# Patient Record
Sex: FEMALE | Race: BLACK OR AFRICAN AMERICAN | NOT HISPANIC OR LATINO
[De-identification: names, ages, dates, MRNs, and addresses within clinical notes are randomized per-mention and may not be internally consistent; named-entity substitution may affect disease eponyms.]

---

## 2021-08-13 PROBLEM — Z00.00 ENCOUNTER FOR PREVENTIVE HEALTH EXAMINATION: Status: ACTIVE | Noted: 2021-08-13

## 2021-08-16 ENCOUNTER — NON-APPOINTMENT (OUTPATIENT)
Age: 66
End: 2021-08-16

## 2021-08-19 ENCOUNTER — APPOINTMENT (OUTPATIENT)
Dept: OTOLARYNGOLOGY | Facility: CLINIC | Age: 66
End: 2021-08-19
Payer: MEDICAID

## 2021-08-19 VITALS
DIASTOLIC BLOOD PRESSURE: 80 MMHG | TEMPERATURE: 97.6 F | SYSTOLIC BLOOD PRESSURE: 134 MMHG | WEIGHT: 170 LBS | HEIGHT: 70 IN | BODY MASS INDEX: 24.34 KG/M2 | HEART RATE: 74 BPM

## 2021-08-19 DIAGNOSIS — M19.90 UNSPECIFIED OSTEOARTHRITIS, UNSPECIFIED SITE: ICD-10-CM

## 2021-08-19 DIAGNOSIS — E78.5 HYPERLIPIDEMIA, UNSPECIFIED: ICD-10-CM

## 2021-08-19 DIAGNOSIS — E04.2 NONTOXIC MULTINODULAR GOITER: ICD-10-CM

## 2021-08-19 DIAGNOSIS — I10 ESSENTIAL (PRIMARY) HYPERTENSION: ICD-10-CM

## 2021-08-19 DIAGNOSIS — Z86.79 PERSONAL HISTORY OF OTHER DISEASES OF THE CIRCULATORY SYSTEM: ICD-10-CM

## 2021-08-19 DIAGNOSIS — Z83.3 FAMILY HISTORY OF DIABETES MELLITUS: ICD-10-CM

## 2021-08-19 DIAGNOSIS — Z87.891 PERSONAL HISTORY OF NICOTINE DEPENDENCE: ICD-10-CM

## 2021-08-19 DIAGNOSIS — I42.9 CARDIOMYOPATHY, UNSPECIFIED: ICD-10-CM

## 2021-08-19 DIAGNOSIS — M85.80 OTHER SPECIFIED DISORDERS OF BONE DENSITY AND STRUCTURE, UNSPECIFIED SITE: ICD-10-CM

## 2021-08-19 DIAGNOSIS — Z78.9 OTHER SPECIFIED HEALTH STATUS: ICD-10-CM

## 2021-08-19 DIAGNOSIS — Z80.9 FAMILY HISTORY OF MALIGNANT NEOPLASM, UNSPECIFIED: ICD-10-CM

## 2021-08-19 PROCEDURE — 99204 OFFICE O/P NEW MOD 45 MIN: CPT | Mod: 25

## 2021-08-19 PROCEDURE — 31575 DIAGNOSTIC LARYNGOSCOPY: CPT

## 2021-08-19 RX ORDER — ATORVASTATIN CALCIUM 20 MG/1
20 TABLET, FILM COATED ORAL
Refills: 0 | Status: ACTIVE | COMMUNITY

## 2021-08-31 PROBLEM — M19.90 OSTEOARTHRITIS: Status: RESOLVED | Noted: 2021-08-31 | Resolved: 2021-08-31

## 2021-08-31 PROBLEM — Z87.891 FORMER SMOKER, STOPPED SMOKING IN DISTANT PAST: Status: ACTIVE | Noted: 2021-08-31

## 2021-08-31 PROBLEM — E04.2 MULTIPLE THYROID NODULES: Status: ACTIVE | Noted: 2021-08-31

## 2021-08-31 RX ORDER — SOTALOL HYDROCHLORIDE TABLES AF 120 MG/1
120 TABLET ORAL
Refills: 0 | Status: ACTIVE | COMMUNITY

## 2021-08-31 RX ORDER — LEFLUNOMIDE 10 MG/1
10 TABLET, FILM COATED ORAL DAILY
Refills: 0 | Status: ACTIVE | COMMUNITY

## 2021-08-31 RX ORDER — FUROSEMIDE 40 MG/1
40 TABLET ORAL TWICE DAILY
Refills: 0 | Status: ACTIVE | COMMUNITY

## 2021-08-31 RX ORDER — CALCIUM CITRATE/VITAMIN D3 200MG-6.25
TABLET ORAL DAILY
Refills: 0 | Status: ACTIVE | COMMUNITY
Start: 2021-08-19

## 2021-08-31 RX ORDER — SPIRONOLACTONE 25 MG/1
25 TABLET ORAL DAILY
Refills: 0 | Status: ACTIVE | COMMUNITY

## 2021-08-31 RX ORDER — SACUBITRIL AND VALSARTAN 24; 26 MG/1; MG/1
24-26 TABLET, FILM COATED ORAL TWICE DAILY
Refills: 0 | Status: ACTIVE | COMMUNITY

## 2021-08-31 RX ORDER — CARVEDILOL 12.5 MG/1
12.5 TABLET, FILM COATED ORAL
Refills: 0 | Status: ACTIVE | COMMUNITY

## 2021-08-31 NOTE — ASSESSMENT
[FreeTextEntry1] : Ms. NEFF has primary hyperparathyroidism and hypercalcemia, with calcium in low 11s an intact PTH 180s.\par She has osteopenia, fatigue and increased urinary frequency.\par US and 4D CT localized a likely left upper parathyroid adenoma.\par She also has multiple thyroid nodules/cysts, none of which are suspicious.\par \par I recommended neck exploration and parathyroidectomy.\par We discussed the neck exploration and parathyroidectomy procedure, along with risks benefits and alternatives. Risks include, but are not limited to, bleeding infection, hypocalcemia, persistent hypercalcemia, voice change, vocal fold paresis/paralysis and cervical scar.  Questions were answered. \par Surgery has been scheduled for 9/27/2021 at [facility]. \par Medical evaluation and clearance by Dr. Amador would be greatly appreciated.\par \par \par

## 2021-08-31 NOTE — CONSULT LETTER
[Dear  ___] : Dear  [unfilled], [( Thank you for referring [unfilled] for consultation for _____ )] : Thank you for referring [unfilled] for consultation for [unfilled] [Please see my note below.] : Please see my note below. [Consult Closing:] : Thank you very much for allowing me to participate in the care of this patient.  If you have any questions, please do not hesitate to contact me. [Sincerely,] : Sincerely, [DrJesus  ___] : Dr. ELMORE [FreeTextEntry2] : Gemma Daniels M.D.\[address]\[place] [address]  [FreeTextEntry3] : \par Carley Gonsalez MD \par Otolaryngology, Head and Neck Surgery \par \par

## 2021-08-31 NOTE — REVIEW OF SYSTEMS
[Patient Intake Form Reviewed] : Patient intake form was reviewed [Post Nasal Drip] : post nasal drip [Shortness Of Breath] : shortness of breath [Arthralgias] : arthralgias [Joint Pain] : joint pain [Joint Stiffness] : joint stiffness [Negative] : Heme/Lymph [FreeTextEntry5] : irregular heart beat

## 2021-08-31 NOTE — PHYSICAL EXAM
[Midline] : trachea located in midline position [Normal] : no rashes [FreeTextEntry1] : No hoarseness.  [Laryngoscopy Performed] : laryngoscopy was performed, see procedure section for findings [de-identified] : Carotid pulses 2+ bilateral.

## 2021-08-31 NOTE — PROCEDURE
[de-identified] : \par Indication:  multiple thyroid nodules\par -Verbal consent was obtained from patient prior to procedure.\par Flexible laryngoscopy was performed via mouth and revealed the following:\par   -- Base of tongue was symmetric and not enlarged.\par   -- Vallecula was clear\par   -- Epiglottis, both aryepiglottic folds and both false vocal folds were normal\par   -- Arytenoids both without edema and erythema \par   -- True vocal folds were fully mobile and without lesions. \par   -- Post cricoid area was clear.\par   -- Interarytenoid edema was absent.\par   -- No lesions in laryngopharynx\par \par The patient tolerated the procedure well.\par

## 2021-08-31 NOTE — HISTORY OF PRESENT ILLNESS
[de-identified] : I was pleased to evaluate Ms. NEFF, a 65 year  old woman who was referred by Dr. Daniels for hypercalcemia and primary hyperparathyroidism.  \par \par A few months ago she found out she has high calciums levels - low 11s, with intact PTH 180s\par She has osteopenia.  She has osteoarthritis in her knees.  She is taking Citracal for her RA.\par She has nocturnal urinary frequency.  She has been more tired lately\par No kidney stones, fractures, bone pain other than in knees, abdominal pain or constipation\par No family history of hypercalcemia or hyperparathyroidism\par \par LABS\par (06/01/2021) -- Ca 11.2, alb 4.1, Cr 0.88\par (2/25/2021) --Ca 11.3\par \par \par CT PARATHYROID WITHOUT AND WITH CONTRAST  (07-) at [facility]\par - COMPARISON: Parathyroid ultrasound performed same day. Chest CT dated 3/20/2014.\par ** PARATHYROID: Left superior parathyroid adenoma is identified as an iodine-poor enhancing nodule along the posterior margin of the left mid thyroid, measuring 1.9 x 0.9 x 0.9 cm (816 mg by CT, axial arterial series 16550 image #274). This is bounded medially by the esophagus, dorsally by longus colli muscle and laterally by the left inferior thyroidal artery and left carotid artery. This corresponds to a 2.1 x 0.8 x 1.0 cm hypoechoic vascular nodule on image #90 of the concurrently performed ultrasound study. This has increased in size in retrospect when compared to the prior chest CT from 3/28/2014.\par Normal sized right superior parathyroid gland (32 mg by CT) is identified as a 0.3 x 0.4 x 0.5 cm enhancing nodule along the posterior margin of the right mid thyroid, bounded medially by the esophagus, dorsally by longus colli muscle and laterally by the inferior thyroidal artery (axial arterial series 51280 image #272). This corresponds to a 1.0 x 0.4 x 0.5 cm mixed echogenicity nodule along the posterior margin of the right mid thyroid on image #47 of the concurrent ultrasound.\par ** THYROID: There are multiple nodules, largest measuring up to 1.0 cm in the right thyroid lobe. There is a nodular focus of ectopic thyroid tissue along the inferior margin of the left lower pole of thyroid, in retrospect not substantially changed compared to prior chest CT.\par ** REMAINING NECK/CHEST: Left-sided pacemaker is observed. There is a periapical lucency overlying the left maxillary second molar tooth with dehiscence of the buccal maxillary cortex. There are atherosclerotic calcifications about the aortic arch and bilateral carotid bulbs. There is torus mandibularis. There are degenerative changes of the imaged cervicothoracic spine. There is a small polypoid projection of soft tissue along the inferomedial margin of the right palatine tonsil (coronal series 09381 image #91).\par IMPRESSION:\par 1.) Left superior parathyroid adenoma (816 mg by CT) along the posterior margin of the left mid thyroid, bounded medially by the esophagus, dorsally by longus colli muscle and laterally by the left inferior thyroidal artery and left carotid artery.\par 2.) Normal-sized right superior parathyroid gland (32 mg by CT) along the posterior margin of the right mid thyroid, bounded medially by the esophagus, dorsally by longus colli muscle and laterally by the right inferior thyroidal artery.\par 3.) Multinodular thyroid gland; please see concurrent ultrasound. Nodular focus of ectopic thyroid tissue along the inferior margin of the left lower pole of thyroid.\par 4.) Periapical lucency overlying the left maxillary second molar tooth with dehiscence of the buccal maxillary cortex. Dental consultation suggested.\par 5.) Degenerative changes of the imaged cervicothoracic spine. \par 6.) Small polypoid projection of soft tissue along the inferomedial margin of the right palatine tonsil, for which ENT correlation with direct visualization is recommended.\par ADDENDUM (07-): With further review, there is difficult evaluation of the 1.1-cm nodular focus of soft tissue directly inferior to the left lower pole of thyroid (precontrast series 96549 image #188) due to streak artifact. While this is most likely to represent ectopic thyroid tissue given high noncontrast density, the possibility of a second enlarged parathyroid gland is conceivable although considered much less likely.\par (Images were reviewed.) \par \par  \par ULTRASOUND PARATHYROID (07-) at [facility]\par - RIGHT LOBE:  5.4 x 1.5 x 2 cm. \par     1.) 1.4 x 1.1 x 1.2 cm upper pole multiseptated cyst with an area of solid mural nodularity \par     2.) 0.5 x 0.4 x 0.5 cm hypoechoic nodule EXOPHYTIC to the right lower pole with minimal vascularity\par     3.) 1.3 x 0.5 x 0.9 cm ovoid heterogeneous hypoechoic nodule posterior to the midpole \par - LEFT LOBE:  5.4 x 1.6 x 2 cm \par     1.) 0.6 x 0.3 x 0.6 cm upper pole cyst \par     2.) 0.7 x 0.5 x 0.7 cm  a lower pole solid and cystic nodule \par     3.) 0.4 x 0.2 x 0.3 cm lower pole hypoechoic nodule \par - 2.1 x 0.8 x 1 cm Heterogeneous solid hypoechoic nodule posterior to the upper to mid pole with central linear vascularity and possible peripheral feeding vessel\par - ISTHMUS: Normal size \par IMPRESSION:  \par 1.) Left parathyroid adenoma\par 2.) Probable right upper parathyroid adenoma with nodule inferior to the right lower lobe, possible parathyroid etiology\par 3.) Patient scheduled for follow-up with parathyroid CT scan\par 4.) Bilateral thyroid nodules without suspicious ultrasound features\par \par \par DEXA scan (11-) at [facility]\par  -- Osteopenia \par \par \par \par \par

## 2021-09-24 ENCOUNTER — APPOINTMENT (OUTPATIENT)
Dept: DISASTER EMERGENCY | Facility: CLINIC | Age: 66
End: 2021-09-24

## 2021-09-24 VITALS
DIASTOLIC BLOOD PRESSURE: 69 MMHG | HEART RATE: 61 BPM | HEIGHT: 70 IN | RESPIRATION RATE: 16 BRPM | WEIGHT: 173.94 LBS | SYSTOLIC BLOOD PRESSURE: 118 MMHG | OXYGEN SATURATION: 98 % | TEMPERATURE: 97 F

## 2021-09-24 DIAGNOSIS — Z01.818 ENCOUNTER FOR OTHER PREPROCEDURAL EXAMINATION: ICD-10-CM

## 2021-09-24 RX ORDER — LEFLUNOMIDE 10 MG/1
1 TABLET ORAL
Qty: 0 | Refills: 0 | DISCHARGE

## 2021-09-24 NOTE — ASU PATIENT PROFILE, ADULT - NSICDXPASTMEDICALHX_GEN_ALL_CORE_FT
PAST MEDICAL HISTORY:  Chronic systolic congestive heart failure     Glaucoma     H/O breast biopsy     H/O cardiomyopathy     H/O ovarian cystectomy     H/O ventricular tachycardia     History of appendectomy     HLD (hyperlipidemia)     HTN (hypertension)     Hyperparathyroidism     Implantable cardioverter-defibrillator (ICD) in situ [employer] Judechronic systolic heart f    SRI on CPAP     Rheumatoid arthritis      PAST MEDICAL HISTORY:  Chronic systolic congestive heart failure     Glaucoma     H/O cardiomyopathy     HLD (hyperlipidemia)     HTN (hypertension)     Hyperparathyroidism     Implantable cardioverter-defibrillator (ICD) in situ [employer] Judechronic systolic heart f    SRI on CPAP     Rheumatoid arthritis     Ventricular tachycardia

## 2021-09-24 NOTE — ASU PATIENT PROFILE, ADULT - NSICDXPASTSURGICALHX_GEN_ALL_CORE_FT
PAST SURGICAL HISTORY:  H/O breast biopsy     H/O ovarian cystectomy     History of appendectomy

## 2021-09-25 LAB — SARS-COV-2 N GENE NPH QL NAA+PROBE: NOT DETECTED

## 2021-09-27 ENCOUNTER — APPOINTMENT (OUTPATIENT)
Dept: OTOLARYNGOLOGY | Facility: HOSPITAL | Age: 66
End: 2021-09-27

## 2021-09-27 ENCOUNTER — NON-APPOINTMENT (OUTPATIENT)
Age: 66
End: 2021-09-27

## 2021-09-27 ENCOUNTER — RESULT REVIEW (OUTPATIENT)
Age: 66
End: 2021-09-27

## 2021-09-27 ENCOUNTER — TRANSCRIPTION ENCOUNTER (OUTPATIENT)
Age: 66
End: 2021-09-27

## 2021-09-27 ENCOUNTER — OUTPATIENT (OUTPATIENT)
Dept: OUTPATIENT SERVICES | Facility: HOSPITAL | Age: 66
LOS: 1 days | Discharge: HOME | End: 2021-09-27
Payer: MEDICARE

## 2021-09-27 VITALS
HEART RATE: 58 BPM | SYSTOLIC BLOOD PRESSURE: 110 MMHG | OXYGEN SATURATION: 98 % | RESPIRATION RATE: 18 BRPM | DIASTOLIC BLOOD PRESSURE: 60 MMHG

## 2021-09-27 DIAGNOSIS — Z98.890 OTHER SPECIFIED POSTPROCEDURAL STATES: Chronic | ICD-10-CM

## 2021-09-27 DIAGNOSIS — Z90.49 ACQUIRED ABSENCE OF OTHER SPECIFIED PARTS OF DIGESTIVE TRACT: Chronic | ICD-10-CM

## 2021-09-27 LAB
PTH-INTACT IO % DIF SERPL: 227.1 PG/ML — HIGH (ref 15–65)
PTH-INTACT IO % DIF SERPL: 38.8 PG/ML — SIGNIFICANT CHANGE UP (ref 15–65)
PTH-INTACT IO % DIF SERPL: 52.3 PG/ML — SIGNIFICANT CHANGE UP (ref 15–65)

## 2021-09-27 PROCEDURE — 60500 EXPLORE PARATHYROID GLANDS: CPT | Mod: GC

## 2021-09-27 PROCEDURE — 88331 PATH CONSLTJ SURG 1 BLK 1SPC: CPT | Mod: 26

## 2021-09-27 PROCEDURE — 93282 PRGRMG EVAL IMPLANTABLE DFB: CPT | Mod: 26

## 2021-09-27 PROCEDURE — 88305 TISSUE EXAM BY PATHOLOGIST: CPT | Mod: 26

## 2021-09-27 RX ORDER — ACETAMINOPHEN 500 MG
650 TABLET ORAL ONCE
Refills: 0 | Status: DISCONTINUED | OUTPATIENT
Start: 2021-09-27 | End: 2021-09-27

## 2021-09-27 RX ORDER — FUROSEMIDE 40 MG
1 TABLET ORAL
Qty: 0 | Refills: 0 | DISCHARGE

## 2021-09-27 RX ORDER — OXYCODONE HYDROCHLORIDE 5 MG/1
5 TABLET ORAL ONCE
Refills: 0 | Status: DISCONTINUED | OUTPATIENT
Start: 2021-09-27 | End: 2021-09-27

## 2021-09-27 RX ORDER — HYDROMORPHONE HYDROCHLORIDE 2 MG/ML
0.5 INJECTION INTRAMUSCULAR; INTRAVENOUS; SUBCUTANEOUS ONCE
Refills: 0 | Status: DISCONTINUED | OUTPATIENT
Start: 2021-09-27 | End: 2021-09-27

## 2021-09-27 RX ORDER — CARVEDILOL PHOSPHATE 80 MG/1
1 CAPSULE, EXTENDED RELEASE ORAL
Qty: 0 | Refills: 0 | DISCHARGE

## 2021-09-27 RX ORDER — ATORVASTATIN CALCIUM 80 MG/1
1 TABLET, FILM COATED ORAL
Qty: 0 | Refills: 0 | DISCHARGE

## 2021-09-27 RX ORDER — SPIRONOLACTONE 25 MG/1
1 TABLET, FILM COATED ORAL
Qty: 0 | Refills: 0 | DISCHARGE

## 2021-09-27 RX ORDER — BENZOCAINE AND MENTHOL 5; 1 G/100ML; G/100ML
1 LIQUID ORAL ONCE
Refills: 0 | Status: DISCONTINUED | OUTPATIENT
Start: 2021-09-27 | End: 2021-09-27

## 2021-09-27 RX ORDER — SOTALOL HCL 120 MG
1 TABLET ORAL
Qty: 0 | Refills: 0 | DISCHARGE

## 2021-09-27 RX ORDER — SACUBITRIL AND VALSARTAN 24; 26 MG/1; MG/1
1 TABLET, FILM COATED ORAL
Qty: 0 | Refills: 0 | DISCHARGE

## 2021-09-27 RX ORDER — LEFLUNOMIDE 10 MG/1
1 TABLET ORAL
Qty: 0 | Refills: 0 | DISCHARGE

## 2021-09-27 RX ORDER — SODIUM CHLORIDE 9 MG/ML
1000 INJECTION, SOLUTION INTRAVENOUS
Refills: 0 | Status: DISCONTINUED | OUTPATIENT
Start: 2021-09-27 | End: 2021-09-27

## 2021-09-27 RX ORDER — INFLUENZA VIRUS VACCINE 15; 15; 15; 15 UG/.5ML; UG/.5ML; UG/.5ML; UG/.5ML
0.5 SUSPENSION INTRAMUSCULAR ONCE
Refills: 0 | Status: DISCONTINUED | OUTPATIENT
Start: 2021-09-27 | End: 2021-09-27

## 2021-09-27 RX ORDER — INFLUENZA VIRUS VACCINE 15; 15; 15; 15 UG/.5ML; UG/.5ML; UG/.5ML; UG/.5ML
0.7 SUSPENSION INTRAMUSCULAR ONCE
Refills: 0 | Status: DISCONTINUED | OUTPATIENT
Start: 2021-09-27 | End: 2021-09-27

## 2021-09-27 NOTE — BRIEF OPERATIVE NOTE - OPERATION/FINDINGS
enlarged left superior parathyroid gland (1.15 g, hypercellular on FS) Enlarged left superior parathyroid gland was located at mid pole area (1.15 gram weight; hypercellular on FS)  Left inferior parathyroid gland was normal in size.  IoPTH gmpusuds909.1, decreased to 52.3 and 38.8 at 6 minutes and 12 minutes, respectively, after parathyroid removal

## 2021-09-27 NOTE — ASU DISCHARGE PLAN (ADULT/PEDIATRIC) - PROVIDER TOKENS
PROVIDER:[TOKEN:[[id number]],SCHEDULEDAPPT:[10/07/2021],SCHEDULEDAPPTTIME:[02:00 PM],ESTABLISHEDPATIENT:[T]]

## 2021-09-27 NOTE — PACU DISCHARGE NOTE - COMMENTS
Discharge instructions given to Pt., pt. verbalized understanding, Surgical site cov. with steri-strips, no bleeding, no swelling noted. anterior neck soft. pt. to be D/c'd home accompanied by daughter.

## 2021-09-27 NOTE — PROGRESS NOTE ADULT - SUBJECTIVE AND OBJECTIVE BOX
EPS Device interrogation    Indication:  pt has a single chamber ICD implanted in 4/17/13 for NICM.  Followed by DR. Gamez at [facility].  Last interrogation was in June per pt. She is here for thyroid surgery.     Device model: 	[employer] Fortify Assura VR 1257.  RV lead is from [employer]. 			    Functioning Mode: VVI 40 bpm. 			    Underlying Rhythm: NSR HR 60s.     Pacemaker dependency: No   <1%    Battery status: 2 years left     Lead parameters:   RV lead:    Sense: 11.6     mV.              Threshold:  0.75    V at 0.5  ms.           Impedance:   430    ohms  Shock coil Impedance:  84 ohms      Observation:   Normal single chamber ICD function. Pt is not pacing dependent.  No recent ventricular events.   Please call back when pt is in OR so EP can turn off Tachy Therapy.  D/W anesthesiologist.  Need to have defib pad at bedside throughout the case when ICD is turned off.   
Statement Selected

## 2021-09-27 NOTE — ASU DISCHARGE PLAN (ADULT/PEDIATRIC) - CARE PROVIDER_API CALL
Carley Gonsalez)  Otolaryngology  [address] [place] [address], [address] [place] [address]  Phone: [phone]  Fax: [phone]  Established Patient  Scheduled Appointment: 10/07/2021 02:00 PM

## 2021-09-27 NOTE — DISCHARGE NOTE NURSING/CASE MANAGEMENT/SOCIAL WORK - PATIENT PORTAL LINK FT
You can access the FollowMyHealth Patient Portal offered by [facility] by registering at the following website: [email or web address]. By joining [facility]’s FollowMyHealth portal, you will also be able to view your health information using other applications (apps) compatible with our system.

## 2021-09-28 PROBLEM — E78.5 HYPERLIPIDEMIA, UNSPECIFIED: Chronic | Status: ACTIVE | Noted: 2021-09-24

## 2021-09-28 PROBLEM — I50.22 CHRONIC SYSTOLIC (CONGESTIVE) HEART FAILURE: Chronic | Status: ACTIVE | Noted: 2021-09-24

## 2021-09-28 PROBLEM — E21.3 HYPERPARATHYROIDISM, UNSPECIFIED: Chronic | Status: ACTIVE | Noted: 2021-09-24

## 2021-09-28 PROBLEM — H40.9 UNSPECIFIED GLAUCOMA: Chronic | Status: ACTIVE | Noted: 2021-09-24

## 2021-09-28 PROBLEM — M06.9 RHEUMATOID ARTHRITIS, UNSPECIFIED: Chronic | Status: ACTIVE | Noted: 2021-09-24

## 2021-09-28 PROBLEM — I47.2 VENTRICULAR TACHYCARDIA: Chronic | Status: ACTIVE | Noted: 2021-09-24

## 2021-09-28 PROBLEM — I10 ESSENTIAL (PRIMARY) HYPERTENSION: Chronic | Status: ACTIVE | Noted: 2021-09-24

## 2021-09-28 PROBLEM — G47.33 OBSTRUCTIVE SLEEP APNEA (ADULT) (PEDIATRIC): Chronic | Status: ACTIVE | Noted: 2021-09-24

## 2021-09-28 PROBLEM — Z86.79 PERSONAL HISTORY OF OTHER DISEASES OF THE CIRCULATORY SYSTEM: Chronic | Status: ACTIVE | Noted: 2021-09-24

## 2021-09-28 PROBLEM — Z95.810 PRESENCE OF AUTOMATIC (IMPLANTABLE) CARDIAC DEFIBRILLATOR: Chronic | Status: ACTIVE | Noted: 2021-09-24

## 2021-09-29 LAB — SURGICAL PATHOLOGY STUDY: SIGNIFICANT CHANGE UP

## 2021-10-07 ENCOUNTER — APPOINTMENT (OUTPATIENT)
Dept: OTOLARYNGOLOGY | Facility: CLINIC | Age: 66
End: 2021-10-07
Payer: MEDICARE

## 2021-10-07 VITALS
TEMPERATURE: 97 F | BODY MASS INDEX: 24.34 KG/M2 | WEIGHT: 170 LBS | HEART RATE: 73 BPM | SYSTOLIC BLOOD PRESSURE: 108 MMHG | HEIGHT: 70 IN | DIASTOLIC BLOOD PRESSURE: 72 MMHG

## 2021-10-07 DIAGNOSIS — E21.0 PRIMARY HYPERPARATHYROIDISM: ICD-10-CM

## 2021-10-07 DIAGNOSIS — Z86.39 PERSONAL HISTORY OF OTHER ENDOCRINE, NUTRITIONAL AND METABOLIC DISEASE: ICD-10-CM

## 2021-10-07 PROCEDURE — 99024 POSTOP FOLLOW-UP VISIT: CPT

## 2021-10-11 PROCEDURE — 36415 COLL VENOUS BLD VENIPUNCTURE: CPT

## 2021-10-11 PROCEDURE — 88331 PATH CONSLTJ SURG 1 BLK 1SPC: CPT

## 2021-10-11 PROCEDURE — 86900 BLOOD TYPING SEROLOGIC ABO: CPT

## 2021-10-11 PROCEDURE — 86850 RBC ANTIBODY SCREEN: CPT

## 2021-10-11 PROCEDURE — 60500 EXPLORE PARATHYROID GLANDS: CPT

## 2021-10-11 PROCEDURE — 88305 TISSUE EXAM BY PATHOLOGIST: CPT

## 2021-10-11 PROCEDURE — 83970 ASSAY OF PARATHORMONE: CPT

## 2021-10-11 PROCEDURE — 86901 BLOOD TYPING SEROLOGIC RH(D): CPT

## 2021-10-17 PROBLEM — E21.0 PRIMARY HYPERPARATHYROIDISM: Status: ACTIVE | Noted: 2021-08-19

## 2021-10-17 PROBLEM — Z86.39 HISTORY OF HYPERCALCEMIA: Status: RESOLVED | Noted: 2021-08-19 | Resolved: 2021-10-17

## 2021-10-17 NOTE — CONSULT LETTER
[Dear  ___] : Dear  [unfilled], [Courtesy Letter:] : I had the pleasure of seeing your patient, [unfilled], in my office today. [Please see my note below.] : Please see my note below. [Consult Closing:] : Thank you very much for allowing me to participate in the care of this patient.  If you have any questions, please do not hesitate to contact me. [Sincerely,] : Sincerely, [FreeTextEntry2] : Gemma Daniels M.D.\[address]\[place] [address]  [FreeTextEntry3] : \par Carley Gonsalez MD \par Otolaryngology, Head and Neck Surgery \par \par   [DrJesus  ___] : Dr. ELMORE [___] : [unfilled]

## 2021-10-17 NOTE — ASSESSMENT
[FreeTextEntry1] : Ms. Gonzales is healing well after parathyroidectomy (left superior) for primary hyperparathyroidism on September 27, 2021.  The parathyroid gland weighed > 1 gram, and IoPTH decreased to < 40 after removal.\par \par - massage surgical site and apply emollients.   Silicone gel sheeting may also be effective in minimizing appearance of scar. \par - F/up with Dr. Daniels\par \par Return in 1 month if not happy with the scar\par

## 2021-10-17 NOTE — PHYSICAL EXAM
[Normal] : normal appearance, well groomed, well nourished, and in no acute distress [FreeTextEntry1] : No hoarseness.  [de-identified] : Dermabond and Steri strips were removed. Monocryl sutures were cut at skin. Surgical site with mild edema.

## 2021-10-17 NOTE — HISTORY OF PRESENT ILLNESS
[de-identified] :  Ms. Gonzales underwent parathyroidectomy (left superior) for primary hyperparathyroidism on September 27, 2021, at [facility]. She went home on day of surgery.\par \par Today she reports doing well. No pain, numbness, tingling or voice change.\par She feels a little tired.\par \par Operative findings:\par - 1.15 gram left superior parathyroid gland removed.\par - IoPTH baseline was 227.1; decreased to 52.3 and 38.8 at 6 minutes and 12 minutes, respectively, after parathyroid gland removal. \par \par \par PATHOLOGY (09/27/2021)\par Left superior parathyroid, parathyroidectomy:\par - Enlarged, hypercellular parathyroid gland (1150 mg)\par - Separate, small fragment of thyroid gland\par

## 2021-10-17 NOTE — PROCEDURE
[de-identified] : \par Transoral flexible laryngoscopy revealed normal vocal fold mobility bilaterally.\par

## 2021-10-24 ENCOUNTER — TRANSCRIPTION ENCOUNTER (OUTPATIENT)
Age: 66
End: 2021-10-24

## 2025-04-09 NOTE — ASU DISCHARGE PLAN (ADULT/PEDIATRIC) - A. DRIVE A CAR, OPERATE POWER TOOLS OR MACHINERY
----- Message from [place] sent at 4/9/2025  9:02 AM CDT -----  Who Called: Yovanny SalgadoPatient is returning phone callPatient called to asked if nurse could give him a call he has a few questions that he wants to ask herPreferred Method of Contact: Phone CallPatient's Preferred Phone Number on File: [phone] Best Call Back Number, if different:Additional Information:        Attempted to call pt back re: questions.   Left a VM for pt to call me back, when available   
Statement Selected